# Patient Record
Sex: MALE | Race: WHITE
[De-identification: names, ages, dates, MRNs, and addresses within clinical notes are randomized per-mention and may not be internally consistent; named-entity substitution may affect disease eponyms.]

---

## 2020-07-30 ENCOUNTER — HOSPITAL ENCOUNTER (OUTPATIENT)
Dept: HOSPITAL 62 - RAD | Age: 65
End: 2020-07-30
Attending: PHYSICIAN ASSISTANT
Payer: MEDICARE

## 2020-07-30 DIAGNOSIS — J44.9: Primary | ICD-10-CM

## 2020-07-30 PROCEDURE — 71046 X-RAY EXAM CHEST 2 VIEWS: CPT

## 2020-07-30 NOTE — RADIOLOGY REPORT (SQ)
EXAM DESCRIPTION:  CHEST PA/LATERAL



IMAGES COMPLETED DATE/TIME:  7/30/2020 10:45 am



REASON FOR STUDY:  J44.9



COMPARISON:  None.



EXAM PARAMETERS:  NUMBER OF VIEWS: two views

TECHNIQUE: Digital Frontal and Lateral radiographic views of the chest acquired.

RADIATION DOSE: NA

LIMITATIONS: none



FINDINGS:  LUNGS AND PLEURA: No opacities, masses or pneumothorax. No pleural effusion.

MEDIASTINUM AND HILAR STRUCTURES: No masses or contour abnormalities.

HEART AND VASCULAR STRUCTURES: Heart normal size.  No evidence for failure.

BONES: No acute findings.

HARDWARE: None in the chest.

OTHER: No other significant finding.



IMPRESSION:  NO SIGNIFICANT RADIOGRAPHIC FINDING IN THE CHEST.



TECHNICAL DOCUMENTATION:  JOB ID:  [id number]

 2011 [employer]- All Rights Reserved



Reading location - IP/workstation name: [email or web address]

## 2020-09-16 ENCOUNTER — HOSPITAL ENCOUNTER (OUTPATIENT)
Dept: HOSPITAL 62 - RT | Age: 65
End: 2020-09-16
Attending: PHYSICIAN ASSISTANT
Payer: MEDICARE

## 2020-09-16 DIAGNOSIS — J44.9: Primary | ICD-10-CM

## 2020-09-16 PROCEDURE — 94060 EVALUATION OF WHEEZING: CPT

## 2020-09-17 NOTE — PULMONARY FUNCTION TEST
Pulmonary Function Test


Date of Procedure:: 09/16/20


INDICATION:: Dyspnea


Referring Provider: Jayson Silva PA-C


Technician: Nan Perkins RRT, RCP





- Report


Spirometry: 





Spirometry:


 


pre-FVC: 4.86 L 109%                                     post-FVC: 5.12 L 114%  

                 


 


pre-FEV:1 3.14 L 88%                                  post-FEV1: 3.15 L 88%     

                                                                                

    


 


pre-FEV1/FVC %: 65                         post-FEV1/FVC%: 61                  

predicted: 79                     


 


pre-FEF25-75%: 1.48 L 42%                        post-FEF25-75%:[1.19 L 33%]


Impression: 





Moderate obstructive ventilatory defect.  Insignificant response to 

bronchodilator therapy.  This does not preclude a clinical trial of 

bronchodilator therapy.